# Patient Record
Sex: MALE | Race: WHITE
[De-identification: names, ages, dates, MRNs, and addresses within clinical notes are randomized per-mention and may not be internally consistent; named-entity substitution may affect disease eponyms.]

---

## 2020-01-06 ENCOUNTER — HOSPITAL ENCOUNTER (OUTPATIENT)
Dept: HOSPITAL 47 - LABWHC1 | Age: 60
Discharge: HOME | End: 2020-01-06
Attending: FAMILY MEDICINE
Payer: COMMERCIAL

## 2020-01-06 DIAGNOSIS — R53.83: ICD-10-CM

## 2020-01-06 DIAGNOSIS — N40.0: Primary | ICD-10-CM

## 2020-01-06 DIAGNOSIS — B19.20: ICD-10-CM

## 2020-01-06 LAB
ALBUMIN SERPL-MCNC: 4.7 G/DL (ref 3.8–4.9)
ALBUMIN/GLOB SERPL: 2.61 G/DL (ref 1.6–3.17)
ALP SERPL-CCNC: 82 U/L (ref 41–126)
ALT SERPL-CCNC: 24 U/L (ref 10–49)
ANION GAP SERPL CALC-SCNC: 1.7 MMOL/L (ref 4–12)
AST SERPL-CCNC: 22 U/L (ref 14–35)
BUN SERPL-SCNC: 18 MG/DL (ref 9–27)
BUN/CREAT SERPL: 16.36 RATIO (ref 12–20)
CALCIUM SPEC-MCNC: 9.6 MG/DL (ref 8.7–10.3)
CHLORIDE SERPL-SCNC: 110 MMOL/L (ref 96–109)
CHOLEST SERPL-MCNC: 201 MG/DL (ref 0–200)
CO2 SERPL-SCNC: 27.3 MMOL/L (ref 21.6–31.8)
ERYTHROCYTE [DISTWIDTH] IN BLOOD BY AUTOMATED COUNT: 5.1 M/UL (ref 4.3–5.9)
ERYTHROCYTE [DISTWIDTH] IN BLOOD: 12.1 % (ref 11.5–15.5)
GLOBULIN SER CALC-MCNC: 1.8 G/DL (ref 1.6–3.3)
GLUCOSE SERPL-MCNC: 105 MG/DL (ref 70–110)
HBA1C MFR BLD: 5.7 % (ref 4–6)
HCT VFR BLD AUTO: 47.2 % (ref 39–53)
HDLC SERPL-MCNC: 39 MG/DL (ref 40–60)
HGB BLD-MCNC: 16.2 GM/DL (ref 13–17.5)
LDLC SERPL CALC-MCNC: 130.8 MG/DL (ref 0–131)
MCH RBC QN AUTO: 31.7 PG (ref 25–35)
MCHC RBC AUTO-ENTMCNC: 34.3 G/DL (ref 31–37)
MCV RBC AUTO: 92.5 FL (ref 80–100)
PLATELET # BLD AUTO: 237 K/UL (ref 150–450)
POTASSIUM SERPL-SCNC: 5.2 MMOL/L (ref 3.5–5.5)
PROT SERPL-MCNC: 6.5 G/DL (ref 6.2–8.2)
SODIUM SERPL-SCNC: 139 MMOL/L (ref 135–145)
T4 FREE SERPL-MCNC: 1.2 NG/DL (ref 0.8–1.8)
TRIGL SERPL-MCNC: 156 MG/DL (ref 0–149)
VLDLC SERPL CALC-MCNC: 31.2 MG/DL (ref 5–40)
WBC # BLD AUTO: 10 K/UL (ref 3.8–10.6)

## 2020-01-06 PROCEDURE — 80053 COMPREHEN METABOLIC PANEL: CPT

## 2020-01-06 PROCEDURE — 85027 COMPLETE CBC AUTOMATED: CPT

## 2020-01-06 PROCEDURE — 36415 COLL VENOUS BLD VENIPUNCTURE: CPT

## 2020-01-06 PROCEDURE — 83036 HEMOGLOBIN GLYCOSYLATED A1C: CPT

## 2020-01-06 PROCEDURE — 84443 ASSAY THYROID STIM HORMONE: CPT

## 2020-01-06 PROCEDURE — 84439 ASSAY OF FREE THYROXINE: CPT

## 2020-01-06 PROCEDURE — 80061 LIPID PANEL: CPT

## 2020-01-06 PROCEDURE — 84153 ASSAY OF PSA TOTAL: CPT

## 2022-12-12 ENCOUNTER — HOSPITAL ENCOUNTER (OUTPATIENT)
Dept: HOSPITAL 47 - CATHEP | Age: 62
Discharge: HOME | End: 2022-12-12
Attending: INTERNAL MEDICINE
Payer: COMMERCIAL

## 2022-12-12 VITALS — BODY MASS INDEX: 29.9 KG/M2

## 2022-12-12 VITALS — TEMPERATURE: 98.4 F | RESPIRATION RATE: 16 BRPM

## 2022-12-12 VITALS — SYSTOLIC BLOOD PRESSURE: 129 MMHG | HEART RATE: 54 BPM | DIASTOLIC BLOOD PRESSURE: 61 MMHG

## 2022-12-12 DIAGNOSIS — E78.5: ICD-10-CM

## 2022-12-12 DIAGNOSIS — I10: ICD-10-CM

## 2022-12-12 DIAGNOSIS — F17.210: ICD-10-CM

## 2022-12-12 DIAGNOSIS — T82.111A: Primary | ICD-10-CM

## 2022-12-12 DIAGNOSIS — I44.4: ICD-10-CM

## 2022-12-12 PROCEDURE — 33286 RMVL SUBQ CAR RHYTHM MNTR: CPT

## 2022-12-12 NOTE — P.EPPROC
- EP Procedure Note


Electrophysiology Procedure Note: 





Procedure: Loop explant under sedation and local anesthesia.





Diagnosis: Loop monitor at [facility], initially implanted for CVA almost 4 years back





Patient was brought to the EP lab in a fasting state.  Written informed consent 

was obtained prior to the procedure.  The subcutaneous device was successfully 

explanted under local anesthesia.  Preoperative antibiotics were administered.  

The wound was closed in layers and dressed per protocol.





Result:


Successful loop monitor explantation.





Patient underwent EP procedure under conscious sedation/moderate sedation, 

monitoring of the level of consciousness and physiologic parameters including 

but not limited to vital signs and oxygenation.  Patient tolerated the procedure

well without any acute complications.


Start time: 1314


Stop time: 1324